# Patient Record
Sex: MALE | Race: WHITE
[De-identification: names, ages, dates, MRNs, and addresses within clinical notes are randomized per-mention and may not be internally consistent; named-entity substitution may affect disease eponyms.]

---

## 2018-05-12 ENCOUNTER — HOSPITAL ENCOUNTER (EMERGENCY)
Dept: HOSPITAL 63 - ER | Age: 78
Discharge: HOME | End: 2018-05-12
Payer: MEDICARE

## 2018-05-12 VITALS — SYSTOLIC BLOOD PRESSURE: 118 MMHG | DIASTOLIC BLOOD PRESSURE: 60 MMHG

## 2018-05-12 DIAGNOSIS — M54.5: ICD-10-CM

## 2018-05-12 DIAGNOSIS — M25.552: Primary | ICD-10-CM

## 2018-05-12 DIAGNOSIS — F10.10: ICD-10-CM

## 2018-05-12 PROCEDURE — 73502 X-RAY EXAM HIP UNI 2-3 VIEWS: CPT

## 2018-05-12 PROCEDURE — 99284 EMERGENCY DEPT VISIT MOD MDM: CPT

## 2018-05-12 NOTE — RAD
Left hip 2 views.

 

HISTORY: Left hip pain

 

AP and lateral views were taken of the left hip. There is no fracture or 

acute osseous abnormality.

 

IMPRESSION:

1. Negative left hip.

 

Electronically signed by: Braeden Sprague MD (5/12/2018 9:51 AM) [facility]

## 2018-05-12 NOTE — PHYS DOC
Past History


Past Medical History:  No Pertinent History


Alcohol Use:  Heavy


Additional Alcohol Information:  


Pt reports he has a few drinks daily of schnapps


Drug Use:  None





Adult General


Chief Complaint


Chief Complaint:  HIP PAIN





hospitals


HPI


69-year-old male presents with left buttocks pain. This pain has come on the 

last 3 days and has been progressive over that time. He did not have any 

initial trauma, fall, overuse, or anything else that he can identify as a 

cause. He is visiting from out of state for a family event. He has not been 

walking more than usual. The pain is a sharp, severe pain in the central 

buttocks. He came in today is concerned that is getting worse. The pain was bad 

enough that he almost fell today while walking. He has never had hip pain in 

the past. He denies fever, chills, nausea, vomiting, diarrhea, constipation. He 

has no other complaints.





Review of Systems


Review of Systems





Constitutional: Denies fever or chills []


Eyes: Denies change in visual acuity, redness, or eye pain []


HENT: Denies nasal congestion or sore throat []


Respiratory: Denies cough or shortness of breath []


Cardiovascular: No additional information not addressed in HPI []


GI: Denies abdominal pain, nausea, vomiting, bloody stools or diarrhea []


: Denies dysuria or hematuria []


Musculoskeletal: Left hip pain[]


Integument: Denies rash or skin lesions []


Neurologic: Denies headache, focal weakness or sensory changes []


Endocrine: Denies polyuria or polydipsia []





All other systems were reviewed and found to be within normal limits, except as 

documented in this note.





Allergies


Allergies





Allergies








Coded Allergies Type Severity Reaction Last Updated Verified


 


  No Known Drug Allergies    5/12/18 No











Physical Exam


Physical Exam





Constitutional: Well developed, well nourished, no acute distress, non-toxic 

appearance. []


HENT: Normocephalic, atraumatic, bilateral external ears normal, oropharynx 

moist, no oral exudates, nose normal. []


Eyes: PERRLA, EOMI, conjunctiva normal, no discharge. [] 


Neck: Normal range of motion, no tenderness, supple, no stridor. [] 


Cardiovascular:Heart rate regular rhythm, no murmur []


Lungs & Thorax:  Bilateral breath sounds clear to auscultation []


Abdomen: Bowel sounds normal, soft, no tenderness, no masses, no pulsatile 

masses. [] 


Skin: Warm, dry, no erythema, no rash. [] 


Back: No tenderness, no CVA tenderness. [] 


Extremities: Pain with palpation of the left piriformis. No pain with hip 

compression or distraction. No pain with internal and external rotation. Hip 

joint feels stable.[] 


Neurologic: Alert and oriented X 3, normal motor function, normal sensory 

function, no focal deficits noted. []


Psychologic: Affect normal, judgement normal, mood normal. []





Current Patient Data


Vital Signs





 Vital Signs








  Date Time  Temp Pulse Resp B/P (MAP) Pulse Ox O2 Delivery O2 Flow Rate FiO2


 


5/12/18 09:05 97.7 92 16  94 Room Air  











EKG


EKG


[]





Radiology/Procedures


Radiology/Procedures


Left hip 2 views.


 


HISTORY: Left hip pain


 


AP and lateral views were taken of the left hip. There is no fracture or 


acute osseous abnormality.


 


IMPRESSION:


1. Negative left hip.


 


Electronically signed by: Braeden Sprague MD (5/12/2018 9:51 AM) [facility][]





Course & Med Decision Making


Course & Med Decision Making


Pertinent Labs and Imaging studies reviewed. (See chart for details)


The patient's x-rays negative for fracture or acute findings. We will treat the 

patient with 3 days of prednisone to expedite anti-inflammatory effect. I will 

also prescribe Flexeril 5 mg for short-term use. I have warned the patient and 

family about fatigue and other side effects potential from Flexeril. The 

patient will not drive while taking the medication. Follow up with his PCP when 

he returns home in a few days.


[]





Dragon Disclaimer


Dragon Disclaimer


This electronic medical record was generated, in whole or in part, using a 

voice recognition dictation system.





Departure


Departure:


Referrals:  


NON,STAFF (PCP)


Scripts


Prednisone (PREDNISONE) 50 Mg Tablet


1 TAB PO DAILY for 3 Days, #3 TAB


   Prov: ESAU CORBETT DO         5/12/18 


Cyclobenzaprine Hcl (CYCLOBENZAPRINE HCL) 5 Mg Tablet


1 TAB PO TID PRN for MUSCLE PAIN for 7 Days, #21 TAB


   Prov: ESAU CORBETT DO         5/12/18











ESAU CORBETT DO May 12, 2018 09:31